# Patient Record
Sex: MALE | Race: WHITE | NOT HISPANIC OR LATINO | Employment: FULL TIME | URBAN - NONMETROPOLITAN AREA
[De-identification: names, ages, dates, MRNs, and addresses within clinical notes are randomized per-mention and may not be internally consistent; named-entity substitution may affect disease eponyms.]

---

## 2018-09-21 ENCOUNTER — OFFICE VISIT CONVERTED (OUTPATIENT)
Dept: FAMILY MEDICINE CLINIC | Facility: CLINIC | Age: 43
End: 2018-09-21
Attending: NURSE PRACTITIONER

## 2018-10-05 ENCOUNTER — OFFICE VISIT CONVERTED (OUTPATIENT)
Dept: FAMILY MEDICINE CLINIC | Facility: CLINIC | Age: 43
End: 2018-10-05
Attending: NURSE PRACTITIONER

## 2018-10-05 ENCOUNTER — CONVERSION ENCOUNTER (OUTPATIENT)
Dept: FAMILY MEDICINE CLINIC | Facility: CLINIC | Age: 43
End: 2018-10-05

## 2020-10-13 ENCOUNTER — OFFICE VISIT CONVERTED (OUTPATIENT)
Dept: FAMILY MEDICINE CLINIC | Facility: CLINIC | Age: 45
End: 2020-10-13
Attending: FAMILY MEDICINE

## 2020-10-13 ENCOUNTER — CONVERSION ENCOUNTER (OUTPATIENT)
Dept: FAMILY MEDICINE CLINIC | Facility: CLINIC | Age: 45
End: 2020-10-13

## 2021-03-23 ENCOUNTER — OFFICE VISIT CONVERTED (OUTPATIENT)
Dept: FAMILY MEDICINE CLINIC | Facility: CLINIC | Age: 46
End: 2021-03-23
Attending: FAMILY MEDICINE

## 2021-05-13 NOTE — PROGRESS NOTES
Progress Note      Patient Name: Israel Burr   Patient ID: [id number]   Sex: Male   YOB: 1975    Primary Care Provider: Edmundo Hernandez DO   Referring Provider: Edmundo Hernandez DO    Visit Date: October 13, 2020    Provider: Edmundo Hernandez DO   Location: [facility]   Location Address: [address] [place] [address]  [id number]   Location Phone: [phone]          Chief Complaint  · Pt here today to discuss allergies.  · Pt wants flu shot.      History Of Present Illness  Israel Burr is a 45 year old /White male who presents for evaluation and treatment of:        Patient presents as a new patient complaining of sinus pain and pressure has had for quite some time basically just wants to have his allergy medicine renewed  smokes not interested quitting no alcohol or substance use       Past Medical History  Disease Name Date Onset Notes   Allergies --  --    Reflux --  --    Ringing of ears --  --    Sinus trouble --  --          Medication List  Name Date Started Instructions   Claritin-D 24 Hour  mg oral tablet extended release 24 hr 10/13/2020 take 1 tablet by oral route once daily for 30 days         Allergy List  Allergen Name Date Reaction Notes   NO KNOWN DRUG ALLERGIES --  --  --        Allergies Reconciled  Family Medical History  Disease Name Relative/Age Notes   Breast Neoplasm Mother/   --    Diabetes  --          Social History  Finding Status Start/Stop Quantity Notes   Alcohol Never --/-- --  --    Lives with --  --/-- --  wife and kids     --  --/-- --  --    Smokeless tobacco Current every day --/-- 1/2 can per day --    Tobacco Never --/-- --  --     --  --/-- --  --          Immunizations  NameDate Admin Mfg Trade Name Lot Number Route Inj VIS Given VIS Publication   Cuabdhnwi83/13/2020 [facility] Fluarix, quadrivalent, preservative free [id number] IM LD 10/13/2020 08/15/2019   Comments: Pt tolerated well          Review of Systems  · Constitutional  o Denies  o : fatigue, night sweats  · Eyes  o Denies  o : double vision, blurred vision  · HENT  o Admits  o : nasal congestion, postnasal drip  o Denies  o : vertigo, recent head injury  · Cardiovascular  o Denies  o : chest pain, irregular heart beats  · Respiratory  o Denies  o : shortness of breath, productive cough  · Gastrointestinal  o Denies  o : nausea, vomiting  · Genitourinary  o Denies  o : dysuria, urinary retention  · Integument  o Denies  o : hair growth change, new skin lesions  · Neurologic  o Denies  o : altered mental status, seizures  · Musculoskeletal  o Denies  o : joint swelling, limitation of motion  · Endocrine  o Denies  o : cold intolerance, heat intolerance  · Heme-Lymph  o Denies  o : petechiae, lymph node enlargement or tenderness  · Allergic-Immunologic  o Denies  o : frequent illnesses      Vitals  Date Time BP Position Site L\R Cuff Size HR RR TEMP (F) WT  HT  BMI kg/m2 BSA m2 O2 Sat FR L/min FiO2        10/13/2020 04:05 /72 Sitting    83 - R  97.9 176lbs 6oz 6'   23.92 2.02 97 %  21%          Physical Examination  · Constitutional  o Appearance  o : no acute distress, well-nourished  · Head and Face  o Head  o :   § Inspection  § : atraumatic, normocephalic  · Eyes  o Eyes  o : extraocular movements intact, no scleral icterus, no conjunctival injection  · Ears, Nose, Mouth and Throat  o Ears  o :   § External Ears  § : normal  o Nose  o :   § Intranasal Exam  § : nares patent  o Oral Cavity  o :   § Oral Mucosa  § : moist mucous membranes  · Respiratory  o Respiratory Effort  o : breathing comfortably, symmetric chest rise  o Auscultation of Lungs  o : clear to asculatation bilaterally, no wheezes, rales, or rhonchii  · Cardiovascular  o Heart  o :   § Auscultation of Heart  § : regular rate and rhythm, no murmurs, rubs, or gallops  o Peripheral Vascular System  o :   § Extremities  § : no edema  · Neurologic  o Mental Status  Examination  o :   § Orientation  § : grossly oriented to person, place and time  o Gait and Station  o :   § Gait Screening  § : normal gait  · Psychiatric  o General  o : normal mood and affect          Assessment  · Allergic rhinitis due to allergen     477.9/J30.9  · Nicotine dependence     305.1/F17.200  · Tobacco abuse counseling       Tobacco abuse counseling     V65.42/Z71.6  · Screening for depression     V79.0/Z13.89  · Need for influenza vaccination     V04.81/Z23         Treat for allergic rhinitis  Follow-up with no improvement  Follow-up at least annually for physical otherwise counseled on smoking cessation     Problems Reconciled  Plan  · Orders  o ACO-17: Screened for tobacco use AND received tobacco cessation intervention (4004F) - 305.1/F17.200 - 10/13/2020  o ACO-17: Screened for tobacco use AND received tobacco cessation intervention (4004F) - V65.42/Z71.6 - 10/13/2020  o ACO-39: Current medications updated and reviewed (, 1159F) - - 10/13/2020  o ACO-18: Negative screen for clinical depression using a standardized tool () - - 10/13/2020  o Fluzone Quadrivalent Vaccine, age 6 months + (16869) - - 10/13/2020   Vaccine - Influenza; Dose: 0.7; Site: Left Deltoid; Route: Intramuscular; Date: 10/13/2020 16:21:00; Exp: 06/30/2021; Lot: [id number]; Mfg: sanofi pasteur; TradeName: Fluarix, quadrivalent, preservative free; Administered By: Elsie Murray Other; Comment: Pt tolerated well  · Medications  o Medications have been Reconciled  o Transition of Care or Provider Policy  · Instructions  o *Form of nicotine being used:   o Patient was strongly encouraged to discontinue use of any nicotine containing product or minimize the use of the product.  o Tobacco and smoking cessation counseling for more than 3 minutes was completed.  o Depression Screen completed and scanned into the EMR under the designated folder within the patient's documents.  o Patient was educated/instructed on their  diagnosis, treatment and medications prior to discharge from the clinic today.  o Patient instructed to seek medical attention urgently for new or worsening symptoms.  o Trusted Web sites were provided.  o Call the office with any concerns or questions.  o Bring all medicines with their bottles to each office visit.  o Risks, benefits, and alternatives were discussed with the patient. The patient is aware of risks associated with:  o Chronic conditions reviewed and taken into consideration for today's treatment plan.  o Electronically Identified Patient Education Materials Provided Electronically  · Disposition  o Call or Return if symptoms worsen or persist.  o Return Visit Request in/on 1 year +/- 2 days (77618).            Electronically Signed by: Edmundo Hernandez DO -Author on October 22, 2020 08:19:53 AM

## 2021-05-14 VITALS
HEART RATE: 83 BPM | BODY MASS INDEX: 23.89 KG/M2 | OXYGEN SATURATION: 97 % | HEIGHT: 72 IN | WEIGHT: 176.37 LBS | TEMPERATURE: 97.9 F | SYSTOLIC BLOOD PRESSURE: 130 MMHG | DIASTOLIC BLOOD PRESSURE: 72 MMHG

## 2021-05-14 VITALS
TEMPERATURE: 98.1 F | WEIGHT: 189.25 LBS | HEART RATE: 72 BPM | BODY MASS INDEX: 25.63 KG/M2 | DIASTOLIC BLOOD PRESSURE: 83 MMHG | HEIGHT: 72 IN | RESPIRATION RATE: 18 BRPM | OXYGEN SATURATION: 99 % | SYSTOLIC BLOOD PRESSURE: 137 MMHG

## 2021-05-14 NOTE — PROGRESS NOTES
Progress Note      Patient Name: Israel Burr   Patient ID: [id number]   Sex: Male   YOB: 1975    Primary Care Provider: Edmundo Hernandez DO   Referring Provider: Edmundo Hernandez DO    Visit Date: March 23, 2021    Provider: Edmundo Hernandez DO   Location: [facility]   Location Address: [address] [place] [address]  [id number]   Location Phone: [phone]          Chief Complaint  · Allergies  · Takes Claritin but doesn't seem to be helping anymore.   · Worse in the mornings   · Rash at times when he gets really hot on his sides and stomach.       History Of Present Illness  Israel Burr is a 45 year old /White male who presents for evaluation and treatment of:        Patient presents complaining of allergies does not feel like it is a sinus infection or anything similar today is feels like he needs to maybe adjust his allergy medicines or do more to help prevent congestion    She also having complaints of erectal dysfunction       Past Medical History  Disease Name Date Onset Notes   Allergies --  --    Erectile Dysfunction --  --    Reflux --  --    Ringing of ears --  --    Sinus trouble --  --          Medication List  Name Date Started Instructions   Claritin-D 24 Hour  mg oral tablet extended release 24 hr 10/13/2020 take 1 tablet by oral route once daily for 30 days   fluticasone propionate 50 mcg/actuation nasal spray,suspension 03/23/2021 spray 2 sprays (100 mcg) in each nostril by intranasal route once daily as needed for 30 days   sildenafil 100 mg oral tablet 03/23/2021 take 1 tablet (100 mg) by oral route once daily as needed approximately 1 hour before sexual activity for 5 days   Xyzal 5 mg oral tablet 03/23/2021 take 1 tablet (5 mg) by oral route once daily in the evening for 30 days         Allergy List  Allergen Name Date Reaction Notes   NO KNOWN DRUG ALLERGIES --  --  --        Allergies Reconciled  Family Medical  History  Disease Name Relative/Age Notes   Breast Neoplasm Mother/   --    Diabetes  --          Social History  Finding Status Start/Stop Quantity Notes   Alcohol Never --/-- --  --    Lives with --  --/-- --  wife and kids     --  --/-- --  --    Smokeless tobacco Current every day --/-- 1/2 can per day --    Tobacco Never --/-- --  --     --  --/-- --  --          Immunizations  NameDate Admin Mfg Trade Name Lot Number Route Inj VIS Given VIS Publication   Vrzhqmunn67/13/2020 [facility] Fluarix, quadrivalent, preservative free [id number] IM LD 10/13/2020 08/15/2019   Comments: Pt tolerated well         Review of Systems  · Constitutional  o Denies  o : fever, fatigue, weight loss, weight gain  · HENT  o Admits  o : nasal congestion, nasal discharge, postnasal drip  o Denies  o : sinus pain  · Cardiovascular  o Denies  o : lower extremity edema, claudication, chest pressure, palpitations  · Respiratory  o Denies  o : shortness of breath, wheezing, cough, hemoptysis, dyspnea on exertion  · Gastrointestinal  o Denies  o : nausea, vomiting, diarrhea, constipation, abdominal pain  · Integument  o Denies  o : rash, itching  · Psychiatric  o Denies  o : anxiety, depression      Vitals  Date Time BP Position Site L\R Cuff Size HR RR TEMP (F) WT  HT  BMI kg/m2 BSA m2 O2 Sat FR L/min FiO2 HC       03/23/2021 04:22 /83 Sitting    72 - R 18 98.1 189lbs 4oz 6'   25.67 2.09 99 %  21%          Physical Examination  · Constitutional  o Appearance  o : no acute distress, well-nourished  · Head and Face  o Head  o :   § Inspection  § : atraumatic, normocephalic  · Ears, Nose, Mouth and Throat  o Ears  o :   § External Ears  § : normal  o Nose  o :   § Intranasal Exam  § : nares patent  o Oral Cavity  o :   § Oral Mucosa  § : moist mucous membranes  · Respiratory  o Respiratory Effort  o : breathing comfortably, symmetric chest rise  o Auscultation of Lungs  o : clear to asculatation bilaterally, no wheezes, rales, or  rhonchii  · Cardiovascular  o Heart  o :   § Auscultation of Heart  § : regular rate and rhythm, no murmurs, rubs, or gallops  o Peripheral Vascular System  o :   § Extremities  § : no edema  · Neurologic  o Mental Status Examination  o :   § Orientation  § : grossly oriented to person, place and time  o Gait and Station  o :   § Gait Screening  § : normal gait  · Psychiatric  o General  o : normal mood and affect          Assessment  · Allergies     461.8  · Sinus trouble     473.9/J34.9  · Erectile dysfunction     607.84/N52.9         Will change allergy medicine  Precautions given  If no improvement or worse please follow-up will prescribe and start antibiotics    Can try sildenafil for erectile dysfunction follow-up if no improvement or worse    Follow-up as above sooner if concerns     Problems Reconciled  Plan  · Orders  o ACO-39: Current medications updated and reviewed (, 1159F) - 461.8, 473.9/J34.9, 607.84/N52.9 - 03/23/2021  · Medications  o Medications have been Reconciled  o Transition of Care or Provider Policy  · Instructions  o Patient is taking medications as prescribed and doing well.   o Take all medications as prescribed/directed.  o Patient was educated/instructed on their diagnosis, treatment and medications prior to discharge from the clinic today.  o Risks, benefits, and alternatives were discussed with the patient. The patient is aware of risks associated with:  o Chronic conditions reviewed and taken into consideration for today's treatment plan.  o Electronically Identified Patient Education Materials Provided Electronically  · Disposition  o Call or Return if symptoms worsen or persist.  o Follow up with PCP as scheduled or make as needed  o Follow up later in week if no better            Electronically Signed by: Edmundo Hernandez DO -Author on March 29, 2021 08:14:12 AM

## 2021-05-16 VITALS
HEART RATE: 53 BPM | OXYGEN SATURATION: 97 % | SYSTOLIC BLOOD PRESSURE: 121 MMHG | RESPIRATION RATE: 20 BRPM | DIASTOLIC BLOOD PRESSURE: 79 MMHG | TEMPERATURE: 97.7 F | BODY MASS INDEX: 22.26 KG/M2 | WEIGHT: 164.31 LBS | HEIGHT: 72 IN

## 2021-05-16 VITALS
HEART RATE: 69 BPM | DIASTOLIC BLOOD PRESSURE: 67 MMHG | SYSTOLIC BLOOD PRESSURE: 125 MMHG | HEIGHT: 72 IN | WEIGHT: 159.25 LBS | TEMPERATURE: 98.3 F | OXYGEN SATURATION: 98 % | BODY MASS INDEX: 21.57 KG/M2 | RESPIRATION RATE: 20 BRPM

## 2021-10-28 ENCOUNTER — FLU SHOT (OUTPATIENT)
Dept: VACCINE CLINIC | Facility: HOSPITAL | Age: 46
End: 2021-10-28

## 2021-10-28 DIAGNOSIS — Z23 NEED FOR INFLUENZA VACCINATION: Primary | ICD-10-CM

## 2022-03-28 ENCOUNTER — OFFICE VISIT (OUTPATIENT)
Dept: FAMILY MEDICINE CLINIC | Facility: CLINIC | Age: 47
End: 2022-03-28

## 2022-03-28 VITALS
WEIGHT: 207.37 LBS | OXYGEN SATURATION: 98 % | DIASTOLIC BLOOD PRESSURE: 95 MMHG | BODY MASS INDEX: 28.09 KG/M2 | TEMPERATURE: 97.8 F | HEIGHT: 72 IN | HEART RATE: 90 BPM | RESPIRATION RATE: 18 BRPM | SYSTOLIC BLOOD PRESSURE: 130 MMHG

## 2022-03-28 DIAGNOSIS — J01.10 ACUTE NON-RECURRENT FRONTAL SINUSITIS: Primary | ICD-10-CM

## 2022-03-28 PROCEDURE — 99213 OFFICE O/P EST LOW 20 MIN: CPT | Performed by: FAMILY MEDICINE

## 2022-03-28 RX ORDER — SILDENAFIL 100 MG/1
TABLET, FILM COATED ORAL
COMMUNITY
Start: 2022-03-03 | End: 2022-03-28 | Stop reason: SDUPTHER

## 2022-03-28 RX ORDER — FLUTICASONE PROPIONATE 50 MCG
SPRAY, SUSPENSION (ML) NASAL
Qty: 16 G | Refills: 2 | Status: SHIPPED | OUTPATIENT
Start: 2022-03-28 | End: 2022-07-18

## 2022-03-28 RX ORDER — SILDENAFIL 100 MG/1
100 TABLET, FILM COATED ORAL AS NEEDED
Qty: 10 TABLET | Refills: 11 | Status: SHIPPED | OUTPATIENT
Start: 2022-03-28

## 2022-03-28 RX ORDER — CETIRIZINE HYDROCHLORIDE 10 MG/1
10 TABLET ORAL DAILY
Qty: 90 TABLET | Refills: 3 | Status: SHIPPED | OUTPATIENT
Start: 2022-03-28

## 2022-03-28 RX ORDER — AMOXICILLIN AND CLAVULANATE POTASSIUM 875; 125 MG/1; MG/1
1 TABLET, FILM COATED ORAL 2 TIMES DAILY
Qty: 20 TABLET | Refills: 0 | Status: SHIPPED | OUTPATIENT
Start: 2022-03-28 | End: 2023-03-20

## 2022-03-28 RX ORDER — AMOXICILLIN AND CLAVULANATE POTASSIUM 875; 125 MG/1; MG/1
1 TABLET, FILM COATED ORAL 2 TIMES DAILY
Qty: 20 TABLET | Refills: 0 | Status: SHIPPED | OUTPATIENT
Start: 2022-03-28 | End: 2022-03-28

## 2022-03-28 NOTE — PROGRESS NOTES
"Chief Complaint  Nasal Congestion (Symptoms started 1 week ago), Facial Pain, Sore Throat, and Cough    Subjective          Israel Burr presents to [facility]  History of Present Illness    Patient presents cough congestion sinus drainage no fever loss of smell or taste, symptoms for a week        Objective   Vital Signs:   /95   Pulse 90   Temp 97.8 °F (36.6 °C)   Resp 18   Ht 182.9 cm (72\")   Wt 94.1 kg (207 lb 5.9 oz)   SpO2 98%   BMI 28.12 kg/m²            Physical Exam  Vitals reviewed.   Constitutional:       Appearance: Normal appearance. He is well-developed.   HENT:      Head: Normocephalic and atraumatic.      Right Ear: External ear normal.      Left Ear: External ear normal.      Nose: Congestion and rhinorrhea present.   Eyes:      Conjunctiva/sclera: Conjunctivae normal.      Pupils: Pupils are equal, round, and reactive to light.   Cardiovascular:      Rate and Rhythm: Normal rate.   Pulmonary:      Effort: Pulmonary effort is normal.      Breath sounds: Normal breath sounds.   Abdominal:      General: There is no distension.   Skin:     General: Skin is warm and dry.   Neurological:      General: No focal deficit present.      Mental Status: He is alert and oriented to person, place, and time.   Psychiatric:         Mood and Affect: Mood and affect normal.         Behavior: Behavior normal.         Thought Content: Thought content normal.         Judgment: Judgment normal.        Result Review :                 Assessment and Plan    Diagnoses and all orders for this visit:    1. Acute non-recurrent frontal sinusitis (Primary)    Other orders  -     Discontinue: amoxicillin-clavulanate (Augmentin) 875-125 MG per tablet; Take 1 tablet by mouth 2 (Two) Times a Day.  Dispense: 20 tablet; Refill: 0  -     amoxicillin-clavulanate (Augmentin) 875-125 MG per tablet; Take 1 tablet by mouth 2 (Two) Times a Day.  Dispense: 20 tablet; Refill: 0  -     " cetirizine (zyrTEC) 10 MG tablet; Take 1 tablet by mouth Daily.  Dispense: 90 tablet; Refill: 3  -     sildenafil (VIAGRA) 100 MG tablet; Take 1 tablet by mouth As Needed for Erectile Dysfunction.  Dispense: 10 tablet; Refill: 11      Precautions given over-the-counter medicines recommended can take antibiotics if no improvement prescribed today        Follow Up   No follow-ups on file.  Patient was given instructions and counseling regarding his condition or for health maintenance advice. Please see specific information pulled into the AVS if appropriate.

## 2022-04-14 RX ORDER — LEVOCETIRIZINE DIHYDROCHLORIDE 5 MG/1
TABLET, FILM COATED ORAL
Qty: 30 TABLET | Refills: 2 | Status: SHIPPED | OUTPATIENT
Start: 2022-04-14 | End: 2022-12-27

## 2022-07-18 RX ORDER — FLUTICASONE PROPIONATE 50 MCG
SPRAY, SUSPENSION (ML) NASAL
Qty: 16 G | Refills: 2 | Status: SHIPPED | OUTPATIENT
Start: 2022-07-18 | End: 2023-02-23

## 2022-12-27 RX ORDER — LEVOCETIRIZINE DIHYDROCHLORIDE 5 MG/1
TABLET, FILM COATED ORAL
Qty: 30 TABLET | Refills: 2 | Status: SHIPPED | OUTPATIENT
Start: 2022-12-27

## 2023-02-23 RX ORDER — FLUTICASONE PROPIONATE 50 MCG
SPRAY, SUSPENSION (ML) NASAL
Qty: 16 G | Refills: 2 | Status: SHIPPED | OUTPATIENT
Start: 2023-02-23

## 2023-03-20 ENCOUNTER — OFFICE VISIT (OUTPATIENT)
Dept: FAMILY MEDICINE CLINIC | Facility: CLINIC | Age: 48
End: 2023-03-20
Payer: COMMERCIAL

## 2023-03-20 VITALS
WEIGHT: 194.7 LBS | SYSTOLIC BLOOD PRESSURE: 113 MMHG | BODY MASS INDEX: 26.37 KG/M2 | OXYGEN SATURATION: 96 % | DIASTOLIC BLOOD PRESSURE: 76 MMHG | HEIGHT: 72 IN | HEART RATE: 78 BPM | TEMPERATURE: 98.2 F

## 2023-03-20 DIAGNOSIS — R68.83 CHILLS: ICD-10-CM

## 2023-03-20 DIAGNOSIS — R05.1 ACUTE COUGH: ICD-10-CM

## 2023-03-20 DIAGNOSIS — U07.1 COVID-19: Primary | ICD-10-CM

## 2023-03-20 LAB
EXPIRATION DATE: ABNORMAL
FLUAV AG UPPER RESP QL IA.RAPID: NOT DETECTED
FLUBV AG UPPER RESP QL IA.RAPID: NOT DETECTED
INTERNAL CONTROL: ABNORMAL
Lab: ABNORMAL
SARS-COV-2 AG UPPER RESP QL IA.RAPID: DETECTED

## 2023-03-20 PROCEDURE — 87428 SARSCOV & INF VIR A&B AG IA: CPT

## 2023-03-20 PROCEDURE — 99213 OFFICE O/P EST LOW 20 MIN: CPT

## 2023-03-20 RX ORDER — PREDNISONE 20 MG/1
TABLET ORAL
Qty: 5 TABLET | Refills: 0 | Status: SHIPPED | OUTPATIENT
Start: 2023-03-20 | End: 2023-03-27

## 2023-03-20 NOTE — PROGRESS NOTES
"Chief Complaint   Patient presents with   • Fever     Will go hours without a temperature then it will start back up. Woke up at 3 am with a fever. Symptoms started on Friday. Hasn't been able to sleep very good since Friday.    • Cough   • Chills   • Dizziness   • Headache   • Generalized Body Aches       Herman Burr presents to [facility]    History of Present Illness  He is here to be seen for fever, cough, chills, dizziness, headache, and body aches. He has had symptoms since Friday. He was tested for covid and was positive. He has not had covid before. He was advised to stay out of work until Thursday this week or until he is fever free for 24 hours if that is past Thursday.      Past History:  Medical History: has no past medical history on file.   Surgical History: has no past surgical history on file.   Family History: family history includes Arthritis in his mother; Cancer in his mother; Diabetes in his mother.   Social History: reports that he has never smoked. He has never been exposed to tobacco smoke. He has never used smokeless tobacco. He reports that he does not currently use alcohol. He reports that he does not currently use drugs.  Allergies: Patient has no known allergies.  (Not in a hospital admission)       Social History     Socioeconomic History   • Marital status:    Tobacco Use   • Smoking status: Never     Passive exposure: Never   • Smokeless tobacco: Never   Substance and Sexual Activity   • Alcohol use: Not Currently   • Drug use: Not Currently   • Sexual activity: Defer       There are no preventive care reminders to display for this patient.    Objective     Vital Signs:   /76 (BP Location: Left arm, Patient Position: Sitting)   Pulse 78   Temp 98.2 °F (36.8 °C) (Infrared)   Ht 182.9 cm (72\")   Wt 88.3 kg (194 lb 11.2 oz)   SpO2 96%   BMI 26.41 kg/m²       Physical Exam  Constitutional:       Appearance: " Normal appearance.   HENT:      Nose: Nose normal.      Mouth/Throat:      Mouth: Mucous membranes are moist.   Cardiovascular:      Rate and Rhythm: Normal rate and regular rhythm.      Pulses: Normal pulses.      Heart sounds: Normal heart sounds.   Pulmonary:      Effort: Pulmonary effort is normal.      Breath sounds: Normal breath sounds.   Skin:     General: Skin is warm and dry.   Neurological:      General: No focal deficit present.      Mental Status: He is alert and oriented to person, place, and time.   Psychiatric:         Mood and Affect: Mood normal.         Behavior: Behavior normal.          Review of Systems   Constitutional: Positive for chills, fatigue and fever.   HENT: Positive for congestion.    Respiratory: Positive for cough.    Neurological: Positive for dizziness and headache.        Result Review :                 Assessment and Plan    Diagnoses and all orders for this visit:    1. COVID-19 (Primary)  -     predniSONE (DELTASONE) 20 MG tablet; Take 1 tablet by mouth Daily for 3 days, THEN 0.5 tablets Daily for 4 days.  Dispense: 5 tablet; Refill: 0    2. Chills  -     POCT SARS-CoV-2 Antigen VITA + Flu    3. Acute cough  -     POCT SARS-CoV-2 Antigen VITA + Flu          Pt thought to be clinically stable at this time.    Follow Up   No follow-ups on file.  Patient was given instructions and counseling regarding his condition or for health maintenance advice. Please see specific information pulled into the AVS if appropriate.

## 2023-04-12 ENCOUNTER — OFFICE VISIT (OUTPATIENT)
Dept: FAMILY MEDICINE CLINIC | Facility: CLINIC | Age: 48
End: 2023-04-12
Payer: COMMERCIAL

## 2023-04-12 VITALS
HEART RATE: 78 BPM | TEMPERATURE: 98.6 F | BODY MASS INDEX: 26.95 KG/M2 | OXYGEN SATURATION: 98 % | WEIGHT: 199 LBS | HEIGHT: 72 IN | SYSTOLIC BLOOD PRESSURE: 129 MMHG | DIASTOLIC BLOOD PRESSURE: 79 MMHG

## 2023-04-12 DIAGNOSIS — J06.9 UPPER RESPIRATORY TRACT INFECTION, UNSPECIFIED TYPE: Primary | ICD-10-CM

## 2023-04-12 RX ORDER — AZITHROMYCIN 250 MG/1
TABLET, FILM COATED ORAL
Qty: 6 TABLET | Refills: 0 | Status: SHIPPED | OUTPATIENT
Start: 2023-04-12

## 2023-04-12 RX ORDER — METHYLPREDNISOLONE 4 MG/1
TABLET ORAL
Qty: 1 EACH | Refills: 0 | Status: SHIPPED | OUTPATIENT
Start: 2023-04-12

## 2023-04-12 NOTE — PROGRESS NOTES
"Chief Complaint   Patient presents with   • Cough     Started Saturday morning. Gets this every year usually in May.    • Earache   • Nasal Congestion     Sinus drainage, feels like it is going into ears.        Subjective          Israel Burr presents to [facility]    History of Present Illness  He is here to be seen for cough, earache, and nasal congestion. The symptoms started on Saturday morning and have not gotten any better. He is needing to get better by Saturday. He states he gets this every year. He has sinus drainage that is yellow in color.       Past History:  Medical History: has no past medical history on file.   Surgical History: has no past surgical history on file.   Family History: family history includes Arthritis in his mother; Cancer in his mother; Diabetes in his mother.   Social History: reports that he has never smoked. He has never been exposed to tobacco smoke. He has never used smokeless tobacco. He reports that he does not currently use alcohol. He reports that he does not currently use drugs.  Allergies: Patient has no known allergies.  (Not in a hospital admission)       Social History     Socioeconomic History   • Marital status:    Tobacco Use   • Smoking status: Never     Passive exposure: Never   • Smokeless tobacco: Never   Substance and Sexual Activity   • Alcohol use: Not Currently   • Drug use: Not Currently   • Sexual activity: Defer       Health Maintenance Due   Topic Date Due   • HEPATITIS C SCREENING  Never done       Objective     Vital Signs:   /79 (BP Location: Right arm, Patient Position: Sitting)   Pulse 78   Temp 98.6 °F (37 °C) (Infrared)   Ht 182.9 cm (72\")   Wt 90.3 kg (199 lb)   SpO2 98%   BMI 26.99 kg/m²       Physical Exam  Constitutional:       Appearance: Normal appearance.   HENT:      Nose: Congestion present.      Mouth/Throat:      Mouth: Mucous membranes are moist.   Cardiovascular:      Rate " and Rhythm: Normal rate and regular rhythm.      Pulses: Normal pulses.      Heart sounds: Normal heart sounds.   Pulmonary:      Effort: Pulmonary effort is normal.      Breath sounds: Normal breath sounds.   Skin:     General: Skin is warm and dry.   Neurological:      General: No focal deficit present.      Mental Status: He is alert and oriented to person, place, and time.   Psychiatric:         Mood and Affect: Mood normal.         Behavior: Behavior normal.          Review of Systems   HENT: Positive for congestion and ear pain.    Respiratory: Positive for cough.         Result Review :                 Assessment and Plan    Diagnoses and all orders for this visit:    1. Upper respiratory tract infection, unspecified type (Primary)  -     azithromycin (Zithromax Z-Aneesh) 250 MG tablet; Take 2 tablets by mouth on day 1, then 1 tablet daily on days 2-5  Dispense: 6 tablet; Refill: 0  -     methylPREDNISolone (MEDROL) 4 MG dose pack; Take as directed on package instructions.  Dispense: 1 each; Refill: 0      I spent 30 minutes caring for Israel on this date of service. This time includes time spent by me in the following activities:preparing for the visit, reviewing tests, obtaining and/or reviewing a separately obtained history, performing a medically appropriate examination and/or evaluation , counseling and educating the patient/family/caregiver, ordering medications, tests, or procedures and documenting information in the medical record    Pt thought to be clinically stable at this time.    Follow Up   No follow-ups on file.  Patient was given instructions and counseling regarding his condition or for health maintenance advice. Please see specific information pulled into the AVS if appropriate.

## 2023-04-24 RX ORDER — SILDENAFIL 100 MG/1
100 TABLET, FILM COATED ORAL AS NEEDED
Qty: 10 TABLET | Refills: 11 | Status: SHIPPED | OUTPATIENT
Start: 2023-04-24

## 2023-04-24 RX ORDER — FLUTICASONE PROPIONATE 50 MCG
SPRAY, SUSPENSION (ML) NASAL
Qty: 48 G | Refills: 1 | Status: SHIPPED | OUTPATIENT
Start: 2023-04-24

## 2023-04-24 RX ORDER — CETIRIZINE HYDROCHLORIDE 10 MG/1
TABLET ORAL
Qty: 90 TABLET | Refills: 3 | Status: SHIPPED | OUTPATIENT
Start: 2023-04-24

## 2024-07-17 RX ORDER — CETIRIZINE HYDROCHLORIDE 10 MG/1
TABLET ORAL
Qty: 90 TABLET | Refills: 3 | OUTPATIENT
Start: 2024-07-17

## 2024-08-12 ENCOUNTER — OFFICE VISIT (OUTPATIENT)
Dept: FAMILY MEDICINE CLINIC | Facility: CLINIC | Age: 49
End: 2024-08-12
Payer: COMMERCIAL

## 2024-08-12 ENCOUNTER — LAB (OUTPATIENT)
Dept: LAB | Facility: HOSPITAL | Age: 49
End: 2024-08-12
Payer: COMMERCIAL

## 2024-08-12 VITALS
DIASTOLIC BLOOD PRESSURE: 82 MMHG | BODY MASS INDEX: 26.63 KG/M2 | HEIGHT: 72 IN | TEMPERATURE: 98 F | SYSTOLIC BLOOD PRESSURE: 116 MMHG | HEART RATE: 59 BPM | RESPIRATION RATE: 16 BRPM | OXYGEN SATURATION: 97 % | WEIGHT: 196.6 LBS

## 2024-08-12 DIAGNOSIS — Z00.00 PHYSICAL EXAM, ANNUAL: Primary | ICD-10-CM

## 2024-08-12 DIAGNOSIS — Z11.59 NEED FOR HEPATITIS C SCREENING TEST: ICD-10-CM

## 2024-08-12 DIAGNOSIS — Z12.5 SCREENING FOR MALIGNANT NEOPLASM OF PROSTATE: ICD-10-CM

## 2024-08-12 DIAGNOSIS — Z12.11 COLON CANCER SCREENING: ICD-10-CM

## 2024-08-12 DIAGNOSIS — J30.1 NON-SEASONAL ALLERGIC RHINITIS DUE TO POLLEN: ICD-10-CM

## 2024-08-12 LAB
ALBUMIN SERPL-MCNC: 4.5 G/DL (ref 3.5–5.2)
ALBUMIN/GLOB SERPL: 1.7 G/DL
ALP SERPL-CCNC: 100 U/L (ref 39–117)
ALT SERPL W P-5'-P-CCNC: 22 U/L (ref 1–41)
ANION GAP SERPL CALCULATED.3IONS-SCNC: 10 MMOL/L (ref 5–15)
AST SERPL-CCNC: 16 U/L (ref 1–40)
BILIRUB SERPL-MCNC: 0.4 MG/DL (ref 0–1.2)
BUN SERPL-MCNC: 14 MG/DL (ref 6–20)
BUN/CREAT SERPL: 12.2 (ref 7–25)
CALCIUM SPEC-SCNC: 10 MG/DL (ref 8.6–10.5)
CHLORIDE SERPL-SCNC: 102 MMOL/L (ref 98–107)
CHOLEST SERPL-MCNC: 156 MG/DL (ref 0–200)
CO2 SERPL-SCNC: 24 MMOL/L (ref 22–29)
CREAT SERPL-MCNC: 1.15 MG/DL (ref 0.76–1.27)
DEPRECATED RDW RBC AUTO: 39 FL (ref 37–54)
EGFRCR SERPLBLD CKD-EPI 2021: 78 ML/MIN/1.73
ERYTHROCYTE [DISTWIDTH] IN BLOOD BY AUTOMATED COUNT: 12.6 % (ref 12.3–15.4)
GLOBULIN UR ELPH-MCNC: 2.6 GM/DL
GLUCOSE SERPL-MCNC: 90 MG/DL (ref 65–99)
HCT VFR BLD AUTO: 47.8 % (ref 37.5–51)
HCV AB SER QL: NORMAL
HDLC SERPL-MCNC: 28 MG/DL (ref 40–60)
HGB BLD-MCNC: 16 G/DL (ref 13–17.7)
LDLC SERPL CALC-MCNC: 96 MG/DL (ref 0–100)
LDLC/HDLC SERPL: 3.24 {RATIO}
MCH RBC QN AUTO: 28.4 PG (ref 26.6–33)
MCHC RBC AUTO-ENTMCNC: 33.5 G/DL (ref 31.5–35.7)
MCV RBC AUTO: 84.8 FL (ref 79–97)
PLATELET # BLD AUTO: 398 10*3/MM3 (ref 140–450)
PMV BLD AUTO: 9 FL (ref 6–12)
POTASSIUM SERPL-SCNC: 3.8 MMOL/L (ref 3.5–5.2)
PROT SERPL-MCNC: 7.1 G/DL (ref 6–8.5)
PSA SERPL-MCNC: 1.25 NG/ML (ref 0–4)
RBC # BLD AUTO: 5.64 10*6/MM3 (ref 4.14–5.8)
SODIUM SERPL-SCNC: 136 MMOL/L (ref 136–145)
T4 FREE SERPL-MCNC: 1.31 NG/DL (ref 0.92–1.68)
TRIGL SERPL-MCNC: 186 MG/DL (ref 0–150)
TSH SERPL DL<=0.05 MIU/L-ACNC: 1.68 UIU/ML (ref 0.27–4.2)
VLDLC SERPL-MCNC: 32 MG/DL (ref 5–40)
WBC NRBC COR # BLD AUTO: 7.53 10*3/MM3 (ref 3.4–10.8)

## 2024-08-12 PROCEDURE — 80050 GENERAL HEALTH PANEL: CPT

## 2024-08-12 PROCEDURE — 36415 COLL VENOUS BLD VENIPUNCTURE: CPT

## 2024-08-12 PROCEDURE — G0103 PSA SCREENING: HCPCS

## 2024-08-12 PROCEDURE — 80061 LIPID PANEL: CPT

## 2024-08-12 PROCEDURE — 84439 ASSAY OF FREE THYROXINE: CPT

## 2024-08-12 PROCEDURE — 99396 PREV VISIT EST AGE 40-64: CPT | Performed by: FAMILY MEDICINE

## 2024-08-12 PROCEDURE — 86803 HEPATITIS C AB TEST: CPT

## 2024-08-12 RX ORDER — SILDENAFIL 100 MG/1
100 TABLET, FILM COATED ORAL AS NEEDED
Qty: 10 TABLET | Refills: 11 | Status: SHIPPED | OUTPATIENT
Start: 2024-08-12

## 2024-08-12 RX ORDER — PANTOPRAZOLE SODIUM 40 MG/1
40 TABLET, DELAYED RELEASE ORAL DAILY
Qty: 60 TABLET | Refills: 1 | Status: SHIPPED | OUTPATIENT
Start: 2024-08-12

## 2024-08-12 RX ORDER — CETIRIZINE HYDROCHLORIDE 10 MG/1
10 TABLET ORAL DAILY
Qty: 90 TABLET | Refills: 3 | Status: SHIPPED | OUTPATIENT
Start: 2024-08-12

## 2024-08-14 NOTE — PROGRESS NOTES
"Chief Complaint  Employment Physical (Hiatal Hernia would like to discuss. )    Subjective          Israel Burr presents to [facility]  History of Present Illness    Patient presents for employment physical also having concerns about upper epigastric hiatal hernia    Objective   Vital Signs:   /82 (BP Location: Left arm, Patient Position: Sitting, Cuff Size: Adult)   Pulse 59   Temp 98 °F (36.7 °C)   Resp 16   Ht 182.9 cm (72\")   Wt 89.2 kg (196 lb 9.6 oz)   SpO2 97%   BMI 26.66 kg/m²            Physical Exam  Vitals reviewed.   Constitutional:       Appearance: Normal appearance. He is well-developed.   HENT:      Head: Normocephalic and atraumatic.      Right Ear: External ear normal.      Left Ear: External ear normal.      Nose: Nose normal.   Eyes:      Conjunctiva/sclera: Conjunctivae normal.      Pupils: Pupils are equal, round, and reactive to light.   Cardiovascular:      Rate and Rhythm: Normal rate.   Pulmonary:      Effort: Pulmonary effort is normal.      Breath sounds: Normal breath sounds.   Abdominal:      General: There is no distension.   Skin:     General: Skin is warm and dry.   Neurological:      Mental Status: He is alert and oriented to person, place, and time. Mental status is at baseline.   Psychiatric:         Mood and Affect: Mood and affect normal.         Behavior: Behavior normal.         Thought Content: Thought content normal.         Judgment: Judgment normal.          Result Review :   The following data was reviewed by: Edmundo Hernandez DO on 08/12/2024:  Common labs          8/12/2024    13:51   Common Labs   Glucose 90    BUN 14    Creatinine 1.15    Sodium 136    Potassium 3.8    Chloride 102    Calcium 10.0    Albumin 4.5    Total Bilirubin 0.4    Alkaline Phosphatase 100    AST (SGOT) 16    ALT (SGPT) 22    WBC 7.53    Hemoglobin 16.0    Hematocrit 47.8    Platelets 398    Total Cholesterol 156    Triglycerides 186    HDL " Cholesterol 28    LDL Cholesterol  96    PSA 1.250                    Assessment and Plan    Diagnoses and all orders for this visit:    1. Physical exam, annual (Primary)    2. Screening for malignant neoplasm of prostate  -     PSA Screen; Future    3. Need for hepatitis C screening test  -     Hepatitis C antibody; Future    4. Non-seasonal allergic rhinitis due to pollen  -     Lipid Panel; Future  -     Comprehensive Metabolic Panel; Future  -     CBC (No Diff); Future  -     TSH+Free T4; Future    5. Colon cancer screening  -     Cologuard - Stool, Per Rectum; Future    Other orders  -     cetirizine (zyrTEC) 10 MG tablet; Take 1 tablet by mouth Daily.  Dispense: 90 tablet; Refill: 3  -     sildenafil (VIAGRA) 100 MG tablet; Take 1 tablet by mouth As Needed for Erectile Dysfunction.  Dispense: 10 tablet; Refill: 11  -     pantoprazole (Protonix) 40 MG EC tablet; Take 1 tablet by mouth Daily.  Dispense: 60 tablet; Refill: 1        Reviewed health status and screened for age appropriate conditions, will order labs today to manage, screen and follow up at least yearly    Recommended and reviewed age appropriate immunizations cancer screenings today     Recommend wearing sunscreen and following up on any concerning skin conditions or lesions, wearing seat belts and other risk reduction measures to lessen incident of death, disease or other     Counseled on risks, disease and advice given on screening and continued management of health and condition through the next year until next physical     Advised taking Protonix to help with heartburn symptoms and hiatal hernia concerns, consider seeing specialist if worsening signs or symptoms or issues      Follow Up   Return in about 1 year (around 8/12/2025), or if symptoms worsen or fail to improve, for Next scheduled follow up, Recheck.  Patient was given instructions and counseling regarding his condition or for health maintenance advice. Please see specific  information pulled into the AVS if appropriate.     Transcribed from ambient dictation for Edmundo Hernandez DO by Edmundo Hernandez DO.  08/14/24   09:37 EDT

## 2024-12-16 RX ORDER — PANTOPRAZOLE SODIUM 40 MG/1
TABLET, DELAYED RELEASE ORAL
Qty: 60 TABLET | Refills: 0 | Status: SHIPPED | OUTPATIENT
Start: 2024-12-16

## 2025-02-19 RX ORDER — PANTOPRAZOLE SODIUM 40 MG/1
TABLET, DELAYED RELEASE ORAL
Qty: 60 TABLET | Refills: 0 | Status: SHIPPED | OUTPATIENT
Start: 2025-02-19

## 2025-04-04 ENCOUNTER — OFFICE VISIT (OUTPATIENT)
Dept: FAMILY MEDICINE CLINIC | Facility: CLINIC | Age: 50
End: 2025-04-04
Payer: COMMERCIAL

## 2025-04-04 VITALS
SYSTOLIC BLOOD PRESSURE: 134 MMHG | HEART RATE: 70 BPM | TEMPERATURE: 97.5 F | WEIGHT: 215.2 LBS | RESPIRATION RATE: 18 BRPM | BODY MASS INDEX: 29.15 KG/M2 | DIASTOLIC BLOOD PRESSURE: 80 MMHG | HEIGHT: 72 IN | OXYGEN SATURATION: 97 %

## 2025-04-04 DIAGNOSIS — J32.9 SINUSITIS, UNSPECIFIED CHRONICITY, UNSPECIFIED LOCATION: Primary | ICD-10-CM

## 2025-04-04 PROCEDURE — 99213 OFFICE O/P EST LOW 20 MIN: CPT

## 2025-04-04 RX ORDER — METHYLPREDNISOLONE 4 MG/1
TABLET ORAL
Qty: 1 EACH | Refills: 0 | Status: SHIPPED | OUTPATIENT
Start: 2025-04-04

## 2025-04-04 NOTE — PROGRESS NOTES
Chief Complaint     Sinusitis and Ear Fullness (Right ear)    Patient or patient representative verbalized consent for the use of Ambient Listening during the visit with  MANISH Levy for chart documentation. 4/4/2025  08:27 EDT    History of Present Illness     Israel Burr is a 49 y.o. male who presents to [facility] .    History of Present Illness  The patient presents for evaluation of sinus congestion.    He reports experiencing clear, watery nasal discharge, which he likens to the consistency of sugar water. He is not experiencing any associated headaches. However, he has been dealing with ear-related issues and occasional gastrointestinal upset, which he attributes to postnasal drainage. These symptoms have persisted for approximately 2.5 to 3 weeks. He has previously tolerated Augmentin without any adverse reactions and has a history of steroid use.    ALLERGIES  The patient has no known allergies.    MEDICATIONS  Past: Augmentin         History      History reviewed. No pertinent past medical history.    History reviewed. No pertinent surgical history.    Family History   Problem Relation Age of Onset    Arthritis Mother     Cancer Mother     Diabetes Mother         Current Medications        Current Outpatient Medications:     cetirizine (zyrTEC) 10 MG tablet, Take 1 tablet by mouth Daily., Disp: 90 tablet, Rfl: 3    levocetirizine (XYZAL) 5 MG tablet, TAKE 1 TABLET BY MOUTH EVERY DAY, Disp: 30 tablet, Rfl: 2    pantoprazole (PROTONIX) 40 MG EC tablet, TAKE 1 TABLET BY MOUTH ONCE DAILY FOR STOMACH, Disp: 60 tablet, Rfl: 0    sildenafil (VIAGRA) 100 MG tablet, Take 1 tablet by mouth As Needed for Erectile Dysfunction., Disp: 10 tablet, Rfl: 11    amoxicillin-clavulanate (AUGMENTIN) 875-125 MG per tablet, Take 1 tablet by mouth 2 (Two) Times a Day for 7 days., Disp: 14 tablet, Rfl: 0    methylPREDNISolone (MEDROL) 4 MG dose pack, Take as directed on package  "instructions., Disp: 1 each, Rfl: 0     Allergies     No Known Allergies    Social History       Social History     Social History Narrative    Not on file       Immunizations     Immunization:  Immunization History   Administered Date(s) Administered    Fluzone (or Fluarix & Flulaval for VFC) >6mos 10/28/2021          Objective     Objective     Vital Signs:   /80 (BP Location: Left arm, Patient Position: Sitting, Cuff Size: Adult)   Pulse 70   Temp 97.5 °F (36.4 °C) (Temporal)   Resp 18   Ht 182.9 cm (72\")   Wt 97.6 kg (215 lb 3.2 oz)   SpO2 97%   BMI 29.19 kg/m²       Physical Exam  Constitutional:       Appearance: Normal appearance.   HENT:      Nose: Nose normal. Congestion present.      Mouth/Throat:      Mouth: Mucous membranes are moist.   Cardiovascular:      Rate and Rhythm: Normal rate and regular rhythm.      Pulses: Normal pulses.      Heart sounds: Normal heart sounds.   Pulmonary:      Effort: Pulmonary effort is normal.      Breath sounds: Normal breath sounds.   Skin:     General: Skin is warm and dry.   Neurological:      General: No focal deficit present.      Mental Status: He is alert and oriented to person, place, and time.   Psychiatric:         Mood and Affect: Mood normal.         Behavior: Behavior normal.         Physical Exam  Lungs were auscultated.      Results    The following data was reviewed by: MANISH Levy on 04/04/2025:          Results         Assessment and Plan        Assessment and Plan    Diagnoses and all orders for this visit:    1. Sinusitis, unspecified chronicity, unspecified location (Primary)  -     amoxicillin-clavulanate (AUGMENTIN) 875-125 MG per tablet; Take 1 tablet by mouth 2 (Two) Times a Day for 7 days.  Dispense: 14 tablet; Refill: 0  -     methylPREDNISolone (MEDROL) 4 MG dose pack; Take as directed on package instructions.  Dispense: 1 each; Refill: 0        Assessment & Plan  1. Sinusitis.  He reports experiencing clear, watery " nasal discharge, which he likens to the consistency of sugar water. He is not experiencing any associated headaches. However, he has been dealing with ear-related issues and occasional gastrointestinal upset, which he attributes to postnasal drainage. These symptoms have persisted for approximately 2.5 to 3 weeks. A prescription for Augmentin 875 mg, to be taken twice daily for 7 days, has been issued. Additionally, a Medrol Dosepak has been prescribed, with specific instructions on its administration provided. Both prescriptions have been sent to [facility] at [facility].        Follow Up        Follow Up   No follow-ups on file.  Patient was given instructions and counseling regarding his condition or for health maintenance advice. Please see specific information pulled into the AVS if appropriate.

## 2025-04-30 RX ORDER — PANTOPRAZOLE SODIUM 40 MG/1
TABLET, DELAYED RELEASE ORAL
Qty: 60 TABLET | Refills: 0 | Status: SHIPPED | OUTPATIENT
Start: 2025-04-30

## 2025-08-15 RX ORDER — SILDENAFIL 100 MG/1
TABLET, FILM COATED ORAL
Qty: 10 TABLET | Refills: 10 | Status: SHIPPED | OUTPATIENT
Start: 2025-08-15 | End: 2025-08-18 | Stop reason: SDUPTHER

## 2025-08-18 ENCOUNTER — LAB (OUTPATIENT)
Dept: LAB | Facility: HOSPITAL | Age: 50
End: 2025-08-18
Payer: COMMERCIAL

## 2025-08-18 ENCOUNTER — OFFICE VISIT (OUTPATIENT)
Dept: FAMILY MEDICINE CLINIC | Facility: CLINIC | Age: 50
End: 2025-08-18
Payer: COMMERCIAL

## 2025-08-18 VITALS
HEART RATE: 48 BPM | BODY MASS INDEX: 27.17 KG/M2 | RESPIRATION RATE: 16 BRPM | HEIGHT: 72 IN | WEIGHT: 200.6 LBS | SYSTOLIC BLOOD PRESSURE: 138 MMHG | TEMPERATURE: 98.7 F | OXYGEN SATURATION: 99 % | DIASTOLIC BLOOD PRESSURE: 94 MMHG

## 2025-08-18 DIAGNOSIS — Z12.5 SCREENING PSA (PROSTATE SPECIFIC ANTIGEN): ICD-10-CM

## 2025-08-18 DIAGNOSIS — R73.01 IMPAIRED FASTING GLUCOSE: ICD-10-CM

## 2025-08-18 DIAGNOSIS — J32.9 SINUSITIS, UNSPECIFIED CHRONICITY, UNSPECIFIED LOCATION: ICD-10-CM

## 2025-08-18 DIAGNOSIS — Z00.00 PHYSICAL EXAM, ANNUAL: Primary | ICD-10-CM

## 2025-08-18 DIAGNOSIS — J30.1 NON-SEASONAL ALLERGIC RHINITIS DUE TO POLLEN: ICD-10-CM

## 2025-08-18 LAB
ALBUMIN SERPL-MCNC: 4.5 G/DL (ref 3.5–5.2)
ALBUMIN/GLOB SERPL: 1.6 G/DL
ALP SERPL-CCNC: 85 U/L (ref 39–117)
ALT SERPL W P-5'-P-CCNC: 24 U/L (ref 1–41)
ANION GAP SERPL CALCULATED.3IONS-SCNC: 13 MMOL/L (ref 5–15)
AST SERPL-CCNC: 20 U/L (ref 1–40)
BILIRUB SERPL-MCNC: 0.5 MG/DL (ref 0–1.2)
BUN SERPL-MCNC: 16 MG/DL (ref 6–20)
BUN/CREAT SERPL: 13.8 (ref 7–25)
CALCIUM SPEC-SCNC: 9.2 MG/DL (ref 8.6–10.5)
CHLORIDE SERPL-SCNC: 104 MMOL/L (ref 98–107)
CHOLEST SERPL-MCNC: 173 MG/DL (ref 0–200)
CO2 SERPL-SCNC: 23 MMOL/L (ref 22–29)
CREAT SERPL-MCNC: 1.16 MG/DL (ref 0.76–1.27)
DEPRECATED RDW RBC AUTO: 41.2 FL (ref 37–54)
EGFRCR SERPLBLD CKD-EPI 2021: 76.7 ML/MIN/1.73
ERYTHROCYTE [DISTWIDTH] IN BLOOD BY AUTOMATED COUNT: 13.1 % (ref 12.3–15.4)
GLOBULIN UR ELPH-MCNC: 2.8 GM/DL
GLUCOSE SERPL-MCNC: 97 MG/DL (ref 65–99)
HBA1C MFR BLD: 5.6 % (ref 4.8–5.6)
HCT VFR BLD AUTO: 50 % (ref 37.5–51)
HDLC SERPL-MCNC: 34 MG/DL (ref 40–60)
HGB BLD-MCNC: 16.7 G/DL (ref 13–17.7)
LDLC SERPL CALC-MCNC: 118 MG/DL (ref 0–100)
LDLC/HDLC SERPL: 3.42 {RATIO}
MCH RBC QN AUTO: 29.1 PG (ref 26.6–33)
MCHC RBC AUTO-ENTMCNC: 33.4 G/DL (ref 31.5–35.7)
MCV RBC AUTO: 87.1 FL (ref 79–97)
PLATELET # BLD AUTO: 370 10*3/MM3 (ref 140–450)
PMV BLD AUTO: 9.1 FL (ref 6–12)
POTASSIUM SERPL-SCNC: 4.8 MMOL/L (ref 3.5–5.2)
PROT SERPL-MCNC: 7.3 G/DL (ref 6–8.5)
PSA SERPL-MCNC: 1.2 NG/ML (ref 0–4)
RBC # BLD AUTO: 5.74 10*6/MM3 (ref 4.14–5.8)
SODIUM SERPL-SCNC: 140 MMOL/L (ref 136–145)
T4 FREE SERPL-MCNC: 1.2 NG/DL (ref 0.92–1.68)
TRIGL SERPL-MCNC: 113 MG/DL (ref 0–150)
TSH SERPL DL<=0.05 MIU/L-ACNC: 1.84 UIU/ML (ref 0.27–4.2)
VLDLC SERPL-MCNC: 21 MG/DL (ref 5–40)
WBC NRBC COR # BLD AUTO: 6.68 10*3/MM3 (ref 3.4–10.8)

## 2025-08-18 PROCEDURE — 36415 COLL VENOUS BLD VENIPUNCTURE: CPT

## 2025-08-18 PROCEDURE — 99396 PREV VISIT EST AGE 40-64: CPT | Performed by: FAMILY MEDICINE

## 2025-08-18 PROCEDURE — 80050 GENERAL HEALTH PANEL: CPT

## 2025-08-18 PROCEDURE — 83036 HEMOGLOBIN GLYCOSYLATED A1C: CPT

## 2025-08-18 PROCEDURE — G0103 PSA SCREENING: HCPCS

## 2025-08-18 PROCEDURE — 80061 LIPID PANEL: CPT

## 2025-08-18 PROCEDURE — 84439 ASSAY OF FREE THYROXINE: CPT

## 2025-08-18 RX ORDER — SILDENAFIL 100 MG/1
100 TABLET, FILM COATED ORAL DAILY PRN
Qty: 10 TABLET | Refills: 11 | Status: SHIPPED | OUTPATIENT
Start: 2025-08-18

## 2025-08-18 RX ORDER — PANTOPRAZOLE SODIUM 40 MG/1
40 TABLET, DELAYED RELEASE ORAL DAILY
Qty: 90 TABLET | Refills: 3 | Status: SHIPPED | OUTPATIENT
Start: 2025-08-18

## 2025-08-18 RX ORDER — LEVOCETIRIZINE DIHYDROCHLORIDE 5 MG/1
5 TABLET, FILM COATED ORAL DAILY
Qty: 90 TABLET | Refills: 3 | Status: SHIPPED | OUTPATIENT
Start: 2025-08-18